# Patient Record
Sex: MALE | Race: BLACK OR AFRICAN AMERICAN | Employment: FULL TIME | URBAN - METROPOLITAN AREA
[De-identification: names, ages, dates, MRNs, and addresses within clinical notes are randomized per-mention and may not be internally consistent; named-entity substitution may affect disease eponyms.]

---

## 2022-04-19 NOTE — PROGRESS NOTES
HPI: Babita Weinstein (: 2000) is a 25 y.o. male, patient, here for evaluation of the following chief complaint(s):    No chief complaint on file. Patient is seen today to evaluate his right thumb. 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 He apparently sustained a hyperextension abduction type injury. He had continuation of pain involving his thumb. An MRI on 2022 at [facility] did confirm an ulnar collateral ligament rupture at the ulnar base of the proximal phalanx where there also was some bone edema. Ligament tear was nearly 100% complete. He was immobilized in with thumb spica splint and is seen for further treatment. Vitals: There were no vitals taken for this visit. There is no height or weight on file to calculate BMI. Not on File    No current outpatient medications on file. No current facility-administered medications for this visit. No past medical history on file. No past surgical history on file. No family history on file. Social History     Tobacco Use    Smoking status: Not on file    Smokeless tobacco: Not on file   Substance Use Topics    Alcohol use: Not on file    Drug use: Not on file        Review of Systems   All other systems reviewed and are negative. Physical Exam    Overall the patient demonstrates good elbow, forearm, wrist and hand range of motion. He has tenderness to the ulnar side of the thumb where there appears to be subtle instability but obvious pain and discomfort. No advanced swelling. Normal left thumb without instability. Imaging:     The outside MRI study from [facility] dated 2022 was independently reviewed and appeared to show a near complete full-thickness tear of the ulnar collateral ligament insertion with edema in the volar ulnar base of the proximal phalanx possible tiny avulsion with a congruent MP joint. No involvement of the radial collateral ligament. ASSESSMENT/PLAN:  Below is the assessment and plan developed based on review of pertinent history, physical exam, labs, studies, and medications. Patient examination. Consistent with a right thumb ulnar collateral ligament rupture at the metacarpal phalangeal joint. The MRI from [facility] was reviewed dated 4/4/2022 and appear to confirm a complete rupture. I reviewed risks and benefits of conservative and operative treatment and answered his questions. This is his dominant right hand and I recommended surgical repair of the ulnar collateral ligament. I reviewed risks that include but not limited to stiffness, pain, nerve or tendon damage and incomplete relief of pain as well as continued instability. Arrangements can be made for this to be performed on an outpatient basis soon as possible. 1. Rupture of ulnar collateral ligament of right thumb, initial encounter  2. Right hand pain      No follow-ups on file. An electronic signature was used to authenticate this note.   -- London Cordova MD

## 2022-04-20 ENCOUNTER — OFFICE VISIT (OUTPATIENT)
Dept: ORTHOPEDIC SURGERY | Age: 22
End: 2022-04-20
Payer: OTHER GOVERNMENT

## 2022-04-20 VITALS — WEIGHT: 150 LBS | BODY MASS INDEX: 22.73 KG/M2 | HEIGHT: 68 IN

## 2022-04-20 DIAGNOSIS — M79.641 RIGHT HAND PAIN: ICD-10-CM

## 2022-04-20 DIAGNOSIS — S63.641A RUPTURE OF ULNAR COLLATERAL LIGAMENT OF RIGHT THUMB, INITIAL ENCOUNTER: Primary | ICD-10-CM

## 2022-04-20 PROCEDURE — 99203 OFFICE O/P NEW LOW 30 MIN: CPT | Performed by: ORTHOPAEDIC SURGERY

## 2022-04-20 RX ORDER — IBUPROFEN 200 MG
TABLET ORAL
COMMUNITY

## 2022-04-20 NOTE — Clinical Note
4/20/2022    Patient: Jim Harvey   YOB: 2000   Date of Visit: 4/20/2022     Nubia Javed 80  Patient [address] [address]  [place]     Dear JAZ Javed,      Thank you for referring Mr. Jim Harvey to [facility] for evaluation. My notes for this consultation are attached. If you have questions, please do not hesitate to call me. I look forward to following your patient along with you.       Sincerely,    Bubba Mesa MD

## 2022-04-25 DIAGNOSIS — S63.641A RUPTURE OF ULNAR COLLATERAL LIGAMENT OF RIGHT THUMB, INITIAL ENCOUNTER: Primary | ICD-10-CM

## 2022-04-25 RX ORDER — HYDROCODONE BITARTRATE AND ACETAMINOPHEN 5; 325 MG/1; MG/1
1 TABLET ORAL
Qty: 15 TABLET | Refills: 0 | Status: SHIPPED | OUTPATIENT
Start: 2022-04-25 | End: 2022-04-28

## 2022-05-04 ENCOUNTER — OFFICE VISIT (OUTPATIENT)
Dept: ORTHOPEDIC SURGERY | Age: 22
End: 2022-05-04
Payer: OTHER GOVERNMENT

## 2022-05-04 DIAGNOSIS — S63.641D RUPTURE OF ULNAR COLLATERAL LIGAMENT OF RIGHT THUMB, SUBSEQUENT ENCOUNTER: Primary | ICD-10-CM

## 2022-05-04 DIAGNOSIS — M79.641 RIGHT HAND PAIN: ICD-10-CM

## 2022-05-04 PROCEDURE — 29075 APPL CST ELBW FNGR SHORT ARM: CPT | Performed by: ORTHOPAEDIC SURGERY

## 2022-05-04 PROCEDURE — 99024 POSTOP FOLLOW-UP VISIT: CPT | Performed by: ORTHOPAEDIC SURGERY

## 2022-05-04 NOTE — LETTER
5/4/2022    Mr. 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 The current plan is for him to be casted for 3 weeks and then progress into a thumb spica splint as he works on range of motion.       Sincerely,      Daniel Armstrong MD

## 2022-05-04 NOTE — PATIENT INSTRUCTIONS
Wearing a Fiberglass Cast: Care Instructions  Your Care Instructions     A cast protects a broken bone or other injury while it heals. Most casts are made of fiberglass. After a cast is put on, you can't remove it yourself. Your doctor or a technician will take it off. Follow-up care is a key part of your treatment and safety. Be sure to make and go to all appointments, and call your doctor if you are having problems. It's also a good idea to know your test results and keep a list of the medicines you take. How can you care for yourself at home? General care  · Follow your doctor's instructions for when you can start using the limb that has the cast. Fiberglass casts dry quickly and are soon hard enough to protect the injured arm or leg. · When it's okay to put weight on your leg or foot cast, don't stand or walk on it unless it's designed for walking. · Prop up the injured arm or leg on a pillow anytime you sit or lie down during the first 3 days. Try to keep it above the level of your heart. This will help reduce swelling. · Put ice or a cold pack on your cast for 10 to 20 minutes at a time. Try to do this every 1 to 2 hours for the next 3 days (when you are awake). Put a thin cloth between the ice and your cast. Keep the cast dry. · Be safe with medicines. Read and follow all instructions on the label. ? If the doctor gave you a prescription medicine for pain, take it as prescribed. ? If you are not taking a prescription pain medicine, ask your doctor if you can take an over-the-counter medicine. · Do exercises as instructed by your doctor or physical therapist. These exercises will help keep your muscles strong and your joints flexible while you heal.  · Wiggle your fingers or toes on the injured arm or leg often. This helps reduce swelling and stiffness. Water and your cast  · Try to keep your cast as dry as you can. The fiberglass part of your cast can get wet.  But getting the inside wet can cause problems. · Use a bag or tape a sheet of plastic to cover your cast when you take a shower or bath or when you have any other contact with water. (Don't take a bath unless you can keep the cast out of the water.) Moisture can collect under the cast and cause skin irritation and itching. It can make infection more likely if you have had surgery or have a wound under the cast.  · If you have a water-resistant cast, ask your doctor how often it can get wet and how to take care of it. Cast and skin care  · Try blowing cool air from a hair dryer or fan into the cast to help relieve itching. Never stick items under your cast to scratch the skin. · Don't use oils or lotions near your cast. If the skin gets red or irritated around the edge of the cast, you may pad the edges with a soft material or use tape to cover them. When should you call for help? Call your doctor now or seek immediate medical care if:    · You have increased or severe pain.     · You feel a warm or painful spot under the cast.     · You have problems with your cast. For example:  ? The skin under the cast burns or stings. ? The cast feels too tight or too loose. ? There is a lot of swelling near the cast. (Some swelling is normal.)  ? You have a new fever. ? There is drainage or a bad smell coming from the cast.     · Your foot or hand is cool or pale or changes color.     · You have trouble moving your fingers or toes.     · You have symptoms of a blood clot in your arm or leg (called a deep vein thrombosis). These may include:  ? Pain in the arm, calf, back of the knee, thigh, or groin. ? Redness and swelling in the arm, leg, or groin. Watch closely for changes in your health, and be sure to contact your doctor if:    · The cast is breaking apart.     · You are not getting better as expected. Where can you learn more?   Go to http://www.gray.com/  Enter O925 in the search box to learn more about \"Wearing a Fiberglass Cast: Care Instructions. \"  Current as of: July 1, 2021               Content Version: 13.2  © 9316-8110 [employer]. Care instructions adapted under license by [employer] (which disclaims liability or warranty for this information). If you have questions about a medical condition or this instruction, always ask your healthcare professional. [place] [address] any warranty or liability for your use of this information.

## 2022-05-04 NOTE — PROGRESS NOTES
HPI: Beatris Haider (: 2000) is a 25 y.o. male, patient, here for evaluation of the following chief complaint(s):    Surgical Follow-up (Right thumb ulnar collateral ligament repair on 2022.)  Patient is seen today to evaluate his right thumb. 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 He apparently sustained a hyperextension abduction type injury. He had continuation of pain involving his thumb. An MRI on 2022 at [facility] did confirm an ulnar collateral ligament rupture at the ulnar base of the proximal phalanx where there also was some bone edema. Ligament tear was nearly 100% complete. He underwent a right thumb ulnar collateral ligament repair on 2022. Vitals: There were no vitals taken for this visit. There is no height or weight on file to calculate BMI. Allergies   Allergen Reactions    Grass Pollen Sneezing       Current Outpatient Medications   Medication Sig    ibuprofen (MOTRIN) 200 mg tablet Take  by mouth. No current facility-administered medications for this visit. No past medical history on file. No past surgical history on file. Family History   Problem Relation Age of Onset    OSTEOARTHRITIS Father         Social History     Tobacco Use    Smoking status: Never Smoker    Smokeless tobacco: Never Used   Substance Use Topics    Alcohol use: Never    Drug use: Not Currently        Review of Systems   All other systems reviewed and are negative. ROS     Positive for: Musculoskeletal    Last edited by Mariel Cerda on 2022 10:47 AM. (History)             Physical Exam    Overall the wound is healing well with absorbing sutures. There is no redness drainage or sign infection. Good clinical alignment and good gentle stability. Imaging:     The outside MRI study from [facility] dated 2022 was independently reviewed and appeared to show a near complete full-thickness tear of the ulnar collateral ligament insertion with edema in the volar ulnar base of the proximal phalanx possible tiny avulsion with a congruent MP joint. No involvement of the radial collateral ligament. ASSESSMENT/PLAN:  Below is the assessment and plan developed based on review of pertinent history, physical exam, labs, studies, and medications. Patient examination. Consistent with a right thumb ulnar collateral ligament rupture at the metacarpal phalangeal joint. The MRI from [facility] was reviewed dated 4/4/2022 and appear to confirm a complete rupture. I reviewed risks and benefits of conservative and operative treatment and answered his questions. This is his dominant right hand and I recommended surgical repair of the ulnar collateral ligament. He underwent a right thumb ulnar collateral ligament repair on 4/26/2022. Continue with simple wound care, gentle motion and strength. Recommended cast for comfort and support. Follow-up in 3 to 4 weeks for removal and likely splinting. The patient also reports that he is anticipating he will be moving onto his duty station in the state of [place] in the near future. 1. Rupture of ulnar collateral ligament of right thumb, subsequent encounter  -     WA APPLY FOREARM CAST  -     CAST SUP SHT ARM ADULT FBRGL  2. Right hand pain      Return in about 4 weeks (around 6/1/2022). An electronic signature was used to authenticate this note.   -- Mira Aden MD

## 2022-05-18 ENCOUNTER — OFFICE VISIT (OUTPATIENT)
Dept: ORTHOPEDIC SURGERY | Age: 22
End: 2022-05-18
Payer: OTHER GOVERNMENT

## 2022-05-18 DIAGNOSIS — S63.641D RUPTURE OF ULNAR COLLATERAL LIGAMENT OF RIGHT THUMB, SUBSEQUENT ENCOUNTER: Primary | ICD-10-CM

## 2022-05-18 PROCEDURE — L3809 WHFO W/O JOINTS PRE OTS: HCPCS | Performed by: ORTHOPAEDIC SURGERY

## 2022-05-18 PROCEDURE — 99024 POSTOP FOLLOW-UP VISIT: CPT | Performed by: ORTHOPAEDIC SURGERY

## 2022-05-18 NOTE — PATIENT INSTRUCTIONS
Hand Exercises      Tendon glides   1. In this exercise, the steps follow one another to a make a continuous movement. 2. With your affected hand, point your fingers and thumb straight up. Your wrist should be relaxed, following the line of your fingers and thumb. 3. Curl your fingers so that the top two joints in them are bent, and your fingers wrap down. Your fingertips should touch or be near the base of your fingers. Your fingers will look like a hook. 4. Make a fist by bending your knuckles. Your thumb can gently rest against your index (pointing) finger. 5. Unwind your fingers slightly so that your fingertips can touch the base of your palm. Your thumb can rest against your index finger. 6. Move back to your starting position, with your fingers and thumb pointing up. 7. Repeat the series of motions 8 to 12 times. 8. Switch hands and repeat steps 1 through 6, even if only one hand is sore. Intrinsic flexion   1. Rest your affected hand on a table and bend the large joints where your fingers connect to your hand. Keep your thumb and the other joints in your fingers straight. 2. Slowly straighten your fingers. Your wrist should be relaxed, following the line of your fingers and thumb. 3. Move back to your starting position, with your hand bent. 4. Repeat 8 to 12 times. 5. Switch hands and repeat steps 1 through 4, even if only one hand is sore. Finger extension   1. Place your affected hand flat on a table. 2. Lift and then lower one finger at a time off the table. 3. Repeat 8 to 12 times. 4. Switch hands and repeat steps 1 through 3, even if only one hand is sore. MP extension   1. Place your good hand on a table, palm up.  Put your affected hand on top of your good hand with your fingers wrapped around the thumb of your good hand like you are making a fist.  2. Slowly uncurl the joints of your affected hand where your fingers connect to your hand so that only the top two joints of your fingers are bent. Your fingers will look like a hook. 3. Move back to your starting position, with your fingers wrapped around your good thumb. 4. Repeat 8 to 12 times. 5. Switch hands and repeat steps 1 through 4, even if only one hand is sore. Skier's or Gamekeeper's Thumb: Care Instructions  Your Care Instructions     Skier's or gamekeeper's thumb is a tear of the ulnar collateral ligament. This injury happens when your thumb is pulled back or to the side, away from your fingers. It often happens when skiers fall on an outstretched hand while holding a ski pole. Rest and other measures at home can help heal a mild injury. You may need to wear a splint or a cast for 4 to 6 weeks. If your injury is severe or not improving, your doctor may refer you to an orthopedic or hand specialist to decide if you need surgery. Follow-up care is a key part of your treatment and safety. Be sure to make and go to all appointments, and call your doctor if you are having problems. It's also a good idea to know your test results and keep a list of the medicines you take. How can you care for yourself at home? · Put ice or a cold pack where your thumb connects to your hand. Do this for up to 20 minutes at a time. Try to do this every 1 to 2 hours for the next 3 days (when you are awake) or until the swelling goes down. Put a thin cloth between the ice and your skin. · Rest your thumb and hand. · Ask your doctor if you can take an over-the-counter pain medicine, such as acetaminophen (Tylenol), ibuprofen (Advil, Motrin), or naproxen (Aleve). Be safe with medicines. Read and follow all instructions on the label. · Follow your doctor's directions for wearing an elastic bandage, splint, or cast on your hand. When should you call for help? Watch closely for changes in your health, and be sure to contact your doctor if:    · You have increasing pain or swelling.     · You cannot use your thumb or hand.    Where can you learn more? Go to http://www.gray.com/  Enter Q046 in the search box to learn more about \"Skier's or Gamekeeper's Thumb: Care Instructions. \"  Current as of: July 1, 2021               Content Version: 13.2  © 3128-1226 [employer]. Care instructions adapted under license by [employer] (which disclaims liability or warranty for this information). If you have questions about a medical condition or this instruction, always ask your healthcare professional. [place] [address] any warranty or liability for your use of this information.

## 2022-05-18 NOTE — PROGRESS NOTES
HPI: Stephanie Galindo (: 2000) is a 25 y.o. male, patient, here for evaluation of the following chief complaint(s):    Surgical Follow-up (Right thumb ulnar collateral ligament repair on 2022. He was placed into a cast on 22.)  Patient is seen today to evaluate his right thumb. 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 He apparently sustained a hyperextension abduction type injury. He had continuation of pain involving his thumb. An MRI on 2022 at [facility] did confirm an ulnar collateral ligament rupture at the ulnar base of the proximal phalanx where there also was some bone edema. Ligament tear was nearly 100% complete. He underwent a right thumb ulnar collateral ligament repair on 2022. He had been protected in a cast and returned for its removal on 2022. Vitals: There were no vitals taken for this visit. There is no height or weight on file to calculate BMI. Allergies   Allergen Reactions    Grass Pollen Sneezing       Current Outpatient Medications   Medication Sig    ibuprofen (MOTRIN) 200 mg tablet Take  by mouth. No current facility-administered medications for this visit. History reviewed. No pertinent past medical history. History reviewed. No pertinent surgical history. Family History   Problem Relation Age of Onset    OSTEOARTHRITIS Father         Social History     Tobacco Use    Smoking status: Never Smoker    Smokeless tobacco: Never Used   Substance Use Topics    Alcohol use: Never    Drug use: Not Currently        Review of Systems   All other systems reviewed and are negative. ROS     Positive for: Musculoskeletal    Last edited by Ryan Harris on 2022  7:58 AM. (History)             Physical Exam    Overall the wound is healed. No redness drainage or sign infection.   Now just about able to oppose the thumb to the tip of the pinky. Imaging: The outside MRI study from [facility] dated 4/4/2022 was independently reviewed and appeared to show a near complete full-thickness tear of the ulnar collateral ligament insertion with edema in the volar ulnar base of the proximal phalanx possible tiny avulsion with a congruent MP joint. No involvement of the radial collateral ligament. ASSESSMENT/PLAN:  Below is the assessment and plan developed based on review of pertinent history, physical exam, labs, studies, and medications. Patient examination. Consistent with a right thumb ulnar collateral ligament rupture at the metacarpal phalangeal joint. The MRI from [facility] was reviewed dated 4/4/2022 and appear to confirm a complete rupture. I reviewed risks and benefits of conservative and operative treatment and answered his questions. This is his dominant right hand and I recommended surgical repair of the ulnar collateral ligament. He underwent a right thumb ulnar collateral ligament repair on 4/26/2022. Patient had his cast removed on 5/18/2022. He was instructed upon gentle motion then strengthening exercises. He is transferred to the state of [place]. Prescription for therapy was provided to help further improve motion and strength carefully. He also was fitted with a thumb spica splint for further comfort and protection. I normally would see him back in 3 to 4 weeks as he continues with his recovery and if his plans change I will see him back at that time otherwise I did wish him well for his future. 1. Rupture of ulnar collateral ligament of right thumb, subsequent encounter  -     ND [facility] W/O JOINTS PRE OTS  -     REFERRAL TO DME  -     REFERRAL TO PHYSICAL THERAPY      Return Patient may return in 3 to 4 weeks but states he is moving to [place]. .    An electronic signature was used to authenticate this note.   -- Lainey Ortiz MD